# Patient Record
Sex: MALE | Race: WHITE | URBAN - NONMETROPOLITAN AREA
[De-identification: names, ages, dates, MRNs, and addresses within clinical notes are randomized per-mention and may not be internally consistent; named-entity substitution may affect disease eponyms.]

---

## 2018-01-05 ENCOUNTER — APPOINTMENT (OUTPATIENT)
Dept: URBAN - NONMETROPOLITAN AREA CLINIC 39 | Age: 28
Setting detail: DERMATOLOGY
End: 2018-01-05

## 2018-01-05 DIAGNOSIS — L91.0 HYPERTROPHIC SCAR: ICD-10-CM

## 2018-01-05 DIAGNOSIS — D22 MELANOCYTIC NEVI: ICD-10-CM

## 2018-01-05 DIAGNOSIS — B36.0 PITYRIASIS VERSICOLOR: ICD-10-CM

## 2018-01-05 PROBLEM — D22.9 MELANOCYTIC NEVI, UNSPECIFIED: Status: ACTIVE | Noted: 2018-01-05

## 2018-01-05 PROBLEM — L20.84 INTRINSIC (ALLERGIC) ECZEMA: Status: ACTIVE | Noted: 2018-01-05

## 2018-01-05 PROCEDURE — 11900 INJECT SKIN LESIONS </W 7: CPT

## 2018-01-05 PROCEDURE — OTHER OTHER: OTHER

## 2018-01-05 PROCEDURE — OTHER EDUCATIONAL RESOURCES PROVIDED: OTHER

## 2018-01-05 PROCEDURE — 99202 OFFICE O/P NEW SF 15 MIN: CPT | Mod: 25

## 2018-01-05 PROCEDURE — OTHER REASSURANCE: OTHER

## 2018-01-05 PROCEDURE — OTHER PRESCRIPTION: OTHER

## 2018-01-05 PROCEDURE — OTHER COUNSELING: OTHER

## 2018-01-05 PROCEDURE — OTHER INTRALESIONAL KENALOG: OTHER

## 2018-01-05 PROCEDURE — OTHER MIPS QUALITY: OTHER

## 2018-01-05 RX ORDER — KETOCONAZOLE 20.5 MG/ML
SHAMPOO, SUSPENSION TOPICAL BIW
Qty: 1 | Refills: 2 | Status: ERX | COMMUNITY
Start: 2018-01-05

## 2018-01-05 RX ORDER — FLUCONAZOLE 200 MG/1
TABLET ORAL
Qty: 4 | Refills: 0 | Status: ERX | COMMUNITY
Start: 2018-01-05

## 2018-01-05 ASSESSMENT — LOCATION DETAILED DESCRIPTION DERM
LOCATION DETAILED: LEFT DISTAL POSTERIOR UPPER ARM
LOCATION DETAILED: LEFT PROXIMAL DORSAL FOREARM

## 2018-01-05 ASSESSMENT — LOCATION SIMPLE DESCRIPTION DERM
LOCATION SIMPLE: LEFT UPPER ARM
LOCATION SIMPLE: LEFT FOREARM

## 2018-01-05 ASSESSMENT — LOCATION ZONE DERM: LOCATION ZONE: ARM

## 2018-01-05 NOTE — PROCEDURE: COUNSELING
Dr. Kaela Tracey rounded and reports if stress is negative pt can go home.
Detail Level: Detailed
Detail Level: Simple
Detail Level: Zone

## 2018-01-05 NOTE — PROCEDURE: OTHER
Detail Level: Simple
Other (Free Text): A-E card given to pt
Note Text (......Xxx Chief Complaint.): This diagnosis correlates with the

## 2018-01-05 NOTE — PROCEDURE: INTRALESIONAL KENALOG
Total Volume Injected (Ccs- Only Use Numbers And Decimals): 0.5
Kenalog Preparation: Kenalog
Include Z78.9 (Other Specified Conditions Influencing Health Status) As An Associated Diagnosis?: Yes
X Size Of Lesion In Cm (Optional): 0
Concentration Of Solution Injected (Mg/Ml): 5.0
Medical Necessity Clause: This procedure was medically necessary because the lesions that were treated were:
Administered By (Optional): TERRA BUNCH
Consent: The risks of atrophy were reviewed with the patient.
Detail Level: Zone